# Patient Record
(demographics unavailable — no encounter records)

---

## 2022-04-11 NOTE — ERPHSYRPT
- History of Present Illness


Time Seen by Provider: 04/11/22 19:48


Source: patient


Exam Limitations: no limitations


Physician History: 





This is a 29-year-old white female who has some anxiety issues and approximately

9 months ago was diagnosed with pneumonia.  She has had pain in the left 

posterior lateral region ever since.  Last 2 months it seems to be getting 

worse.  She has not fallen she does not have a cough.  She has not had a fever. 

The patient also told the nurse that she thinks that she might have an STD.  She

is not having any dysuria, hematuria or frequency she has no abdominal pain.  

She has no nausea vomiting or diarrhea symptoms but she does have flank pain on 

the left side.  Patient drove herself into the hospital.


Timing/Duration: other (2 months)


Method of Injury: other (No trauma)


Quality: dull, aching


Back Pain Location: T-spine, paraspinous muscles


Severity of Pain-Max: mild


Severity of Pain-Current: mild


Associated Symptoms: denies symptoms


Previous symptoms: same symptoms as today, no recent treatment


Allergies/Adverse Reactions: 








metoclopramide [From Reglan] Allergy (Verified 04/11/22 20:07)


   





Home Medications: 








Alprazolam [Xanax] 1 tab PO QID 04/11/22 [History]


Gabapentin 1 tab PO HS 04/11/22 [History]


Gabapentin 100 mg*** [Neurontin 100 MG***] 200 mg PO BID 04/11/22 [History]


Galcanezumab-Gnlm [Emgality Syringe] 120 mg IM DIRECTIONS UNKNOWN 04/11/22 

[History]


Sumatriptan Succinate 6 mg*** [Imitrex 6 MG/0.5 ML***] 6 mg IM DAILY PRN 

04/11/22 [History]


Tizanidine HCl 4 mg** [Zanaflex 4 MG**] 2 tab PO HS 04/11/22 [History]








Travel Risk





- International Travel


Have you traveled outside of the country in past 3 weeks: No





- Coronavirus Screening


Are you exhibiting any of the following symptoms?: No


Close contact with a COVID-19 positive Pt in past 14-21 Days: No





- Review of Systems


Constitutional: No Symptoms


Eyes: No Symptoms


Ears, Nose, & Throat: No Symptoms


Respiratory: No Symptoms


Cardiac: No Symptoms


Abdominal/Gastrointestinal: No Symptoms


Genitourinary Symptoms: No Symptoms


Musculoskeletal: Back Pain, No Fall, No Injury


Skin: No Symptoms


Neurological: No Symptoms


Psychological: No Symptoms


Endocrine: No Symptoms


Hematologic/Lymphatic: No Symptoms


Immunological/Allergic: No Symptoms


All Other Systems: Reviewed and Negative





- Past Medical History


Pertinent Past Medical History: Yes





- Past Surgical History


Past Surgical History: Yes





- Nursing Vital Signs


Nursing Vital Signs: 


                               Initial Vital Signs











Temperature  99.2 F   04/11/22 20:10


 


Pulse Rate  76   04/11/22 20:10


 


Respiratory Rate  18   04/11/22 20:10


 


Blood Pressure  122/77   04/11/22 20:10


 


O2 Sat by Pulse Oximetry  99   04/11/22 20:10








                                   Pain Scale











Pain Intensity [Center of back 8





all the way across]            


 


Pain Intensity                 6

















- Physical Exam


General Appearance: no apparent distress, alert, anxiety


Eye Exam: PERRL/EOMI, eyes nml inspection


Ears, Nose, Throat Exam: normal ENT inspection, moist mucous membranes


Neck Exam: normal inspection, non-tender, supple, full range of motion


Respiratory Exam: normal breath sounds, lungs clear, airway intact, No chest 

tenderness, No respiratory distress


Gastrointestinal Exam: No tenderness


Pelvic Exam: not done


Rectal Exam: not done


Back Exam: normal inspection, normal range of motion, CVA tenderness (?  Left 

side), muscle spasm (Left side thoracic spine level), No vertebral tenderness


Extremity Exam: normal inspection, normal range of motion, pelvis stable


Neurologic Exam: alert, oriented x 3, cooperative, CNs II-XII nml as tested, 

normal mood/affect, nml cerebellar function, nml station & gait, sensation nml


Skin Exam: normal color, warm, dry


Lymphatic Exam: No adenopathy


**SpO2 Interpretation**: normal


O2 Delivery: Room Air





- Course


Nursing assessment & vital signs reviewed: Yes


Ordered Tests: 


                               Active Orders 24 hr











 Category Date Time Status


 


 CHEST 1 VIEW (PORTABLE) Stat Exams  04/11/22 20:29 Taken


 


 CULTURE,URINE Stat Lab  04/11/22 20:36 Received








Medication Summary














Discontinued Medications














Generic Name Dose Route Start Last Admin





  Trade Name Freq  PRN Reason Stop Dose Admin


 


Hydrocodone Bitart/Acetaminophen  2 tab  04/11/22 22:01  04/11/22 22:06





  Hydrocodone/Apap 5/325 Mg Tablet  PO  04/11/22 22:02  2 tab





  SENT HOME W/ PATIENT ONE   Administration


 


Hydrocodone Bitart/Acetaminophen  Confirm  04/11/22 22:05 





  Hydrocodone/Apap 5/325 Mg Tablet  Administered  04/11/22 22:06 





  Dose  





  2 tab  





  .ROUTE  





  .STK-MED ONE  


 


Ciprofloxacin  500 mg  04/11/22 21:55  04/11/22 21:57





  Ciprofloxacin 500 Mg Tablet  PO  04/11/22 21:56  500 mg





  STAT ONE   Administration


 


Ciprofloxacin  Confirm  04/11/22 21:57 





  Ciprofloxacin 500 Mg Tablet  Administered  04/11/22 21:58 





  Dose  





  500 mg  





  .ROUTE  





  .STK-MED ONE  











Lab/Rad Data: 


                               Laboratory Results











  04/11/22 04/11/22 Range/Units





  Unknown 20:36 


 


Urinalys Dipstick Clnc   MAIN LAB  


 


Urine Color   YELLOW  (YELLOW)  


 


Urine Appearance   SLIGHTLY CLOUDY  (CLEAR)  


 


Urine pH   6.0  (5-6)  


 


Ur Specific Gravity   1.025  (1.005-1.025)  


 


POC Urine Protein Conf   TRACE  (Negative)  


 


Urine Ketones   MODERATE-40  (NEGATIVE)  


 


Urine Nitrite   NEGATIVE  (NEGATIVE)  


 


Urine Bilirubin   SMALL  (NEGATIVE)  


 


Urine Urobilinogen   0.2  (0-1)  mg/dL


 


Urine Leukocytes   SMALL  (NEGATIVE)  


 


Urine WBC (Auto)   16-25  (0-5)  /HPF


 


Urine RBC (Auto)   3-5  (0-2)  /HPF


 


U Epithel Cells (Auto)   RARE  (FEW)  /HPF


 


Urine Bacteria (Auto)   RARE  (NEGATIVE)  /HPF


 


Urine RBC   NEGATIVE  (0-5)  Wyatt/ul


 


Urine Mucus (Auto)   MANY  (NEGATIVE)  /HPF


 


Ur Culture Indicated?   YES  


 


Urine Glucose   NEGATIVE  (NEGATIVE)  mg/dL


 


Chlamydia DNA Probe  NOT DETECTED   (NEGATIVE)  


 


N.gonorrhoeae DNA Probe  NOT DETECTED   (NEGATIVE)  














- Progress


Progress: unchanged


Progress Note: 





04/11/22 21:35


Chest x-ray shows no acute cardiopulmonary process.


Counseled pt/family regarding: lab results, diagnosis, need for follow-up, rad 

results





- Departure


Departure Disposition: Home


Clinical Impression: 


 Back pain, UTI (urinary tract infection)





Condition: Stable


Critical Care Time: No


Referrals: 


DOCTOR,NO FAMILY [Primary Care Provider] - Follow up/PCP as directed


Additional Instructions: 


Continue your gabapentin and Zanaflex as prescribed.  Call your prescribing 

physician tomorrow morning, 4/12/2022 for further evaluation and management.  

Take your antibiotics as prescribed


Prescriptions: 


Ciprofloxacin [Cipro 500 MG***] 500 mg PO BID #14 tablet

## 2022-04-12 NOTE — XRAY
Indication: Left rib pain.  No known injury.



Comparison: None



Portable chest demonstrates normal heart, lungs, and bony thorax.